# Patient Record
Sex: FEMALE | Race: WHITE | NOT HISPANIC OR LATINO | Employment: UNEMPLOYED | URBAN - METROPOLITAN AREA
[De-identification: names, ages, dates, MRNs, and addresses within clinical notes are randomized per-mention and may not be internally consistent; named-entity substitution may affect disease eponyms.]

---

## 2018-08-27 ENCOUNTER — OFFICE VISIT (OUTPATIENT)
Dept: URGENT CARE | Facility: CLINIC | Age: 58
End: 2018-08-27
Payer: OTHER GOVERNMENT

## 2018-08-27 VITALS
OXYGEN SATURATION: 94 % | HEART RATE: 95 BPM | DIASTOLIC BLOOD PRESSURE: 60 MMHG | RESPIRATION RATE: 18 BRPM | SYSTOLIC BLOOD PRESSURE: 128 MMHG | BODY MASS INDEX: 28.7 KG/M2 | WEIGHT: 152 LBS | HEIGHT: 61 IN | TEMPERATURE: 98.9 F

## 2018-08-27 DIAGNOSIS — J98.01 BRONCHOSPASM: ICD-10-CM

## 2018-08-27 DIAGNOSIS — J22 LRTI (LOWER RESPIRATORY TRACT INFECTION): ICD-10-CM

## 2018-08-27 DIAGNOSIS — R05.9 COUGH: ICD-10-CM

## 2018-08-27 DIAGNOSIS — J04.0 LARYNGITIS: ICD-10-CM

## 2018-08-27 PROCEDURE — 99204 OFFICE O/P NEW MOD 45 MIN: CPT | Performed by: PHYSICIAN ASSISTANT

## 2018-08-27 RX ORDER — FLUTICASONE PROPIONATE 50 MCG
1 SPRAY, SUSPENSION (ML) NASAL DAILY
Qty: 16 G | Refills: 0 | Status: SHIPPED | OUTPATIENT
Start: 2018-08-27

## 2018-08-27 RX ORDER — METHYLPREDNISOLONE 4 MG/1
TABLET ORAL
Qty: 1 KIT | Refills: 0 | Status: SHIPPED | OUTPATIENT
Start: 2018-08-27 | End: 2023-10-05

## 2018-08-27 RX ORDER — AZITHROMYCIN 250 MG/1
TABLET, FILM COATED ORAL
Qty: 1 QUANTITY SUFFICIENT | Refills: 0 | Status: SHIPPED | OUTPATIENT
Start: 2018-08-27 | End: 2023-10-05

## 2018-08-27 ASSESSMENT — ENCOUNTER SYMPTOMS
SORE THROAT: 0
COUGH: 1
VOMITING: 0
CHILLS: 0
ABDOMINAL PAIN: 0
NAUSEA: 0
HEADACHES: 1
WHEEZING: 1
PALPITATIONS: 0
DIARRHEA: 0
SHORTNESS OF BREATH: 0
SINUS PAIN: 0
EYE REDNESS: 0
EYE DISCHARGE: 0
FEVER: 0

## 2018-08-27 NOTE — PROGRESS NOTES
Subjective:     Manuela Restrepo is a 58 y.o. female who presents for Cough (sore throat, loss of voice x1 year)       Notes last two d of low grade fever, notes persistent dry cough, denies wheeze, low grade fever Thursday and Sunday, denies pain w/ loss of voice, denies nausea/vomiting/abdpain/diarrhea/rash, tried aspirin. Denies PMH of asthma, PMH of bronchitis 4x in the last 1-2yrs, remote PMH of pneumonia, does have seasonal allerg w/ no tx.       Cough   Associated symptoms include headaches and wheezing. Pertinent negatives include no chest pain, chills, ear pain, eye redness, fever, rash, sore throat or shortness of breath. Her past medical history is significant for environmental allergies.   No past medical history on file.No past surgical history on file.  Social History     Social History   • Marital status:      Spouse name: N/A   • Number of children: N/A   • Years of education: N/A     Occupational History   • Not on file.     Social History Main Topics   • Smoking status: Never Smoker   • Smokeless tobacco: Never Used   • Alcohol use Yes   • Drug use: No   • Sexual activity: Not on file     Other Topics Concern   • Not on file     Social History Narrative   • No narrative on file    No family history on file. Review of Systems   Constitutional: Positive for malaise/fatigue. Negative for chills and fever.   HENT: Positive for congestion. Negative for ear pain, sinus pain and sore throat.    Eyes: Negative for discharge and redness.   Respiratory: Positive for cough and wheezing. Negative for shortness of breath.    Cardiovascular: Negative for chest pain, palpitations and leg swelling.   Gastrointestinal: Negative for abdominal pain, diarrhea, nausea and vomiting.   Skin: Negative for rash.   Neurological: Positive for headaches.   Endo/Heme/Allergies: Positive for environmental allergies.     Allergies   Allergen Reactions   • Codeine Itching     Patient states that she gets nauseated and  "starts to sweat      Objective:   /60   Pulse 95   Temp 37.2 °C (98.9 °F)   Resp 18   Ht 1.549 m (5' 1\")   Wt 68.9 kg (152 lb)   SpO2 94%   BMI 28.72 kg/m²   Physical Exam   Constitutional: She is oriented to person, place, and time. She appears well-developed and well-nourished. No distress.   HENT:   Head: Normocephalic and atraumatic.   Right Ear: Tympanic membrane, external ear and ear canal normal.   Left Ear: Tympanic membrane, external ear and ear canal normal.   Nose: Nose normal.   Mouth/Throat: Uvula is midline and mucous membranes are normal. Posterior oropharyngeal erythema ( mild PND) present. No oropharyngeal exudate, posterior oropharyngeal edema or tonsillar abscesses.   Eyes: Conjunctivae and lids are normal. Right eye exhibits no discharge. Left eye exhibits no discharge. No scleral icterus.   Neck: Neck supple.   Pulmonary/Chest: Effort normal. No accessory muscle usage. No respiratory distress. She has no decreased breath sounds. She has wheezes. She has rhonchi (mild). She has no rales.   Musculoskeletal: Normal range of motion.   Lymphadenopathy:     She has cervical adenopathy ( mild bilat).   Neurological: She is alert and oriented to person, place, and time. She is not disoriented.   Skin: Skin is warm and dry. She is not diaphoretic. No erythema. No pallor.   Psychiatric: Her speech is normal and behavior is normal.   Nursing note and vitals reviewed.        Assessment/Plan:   Assessment    1. LRTI (lower respiratory tract infection)  - azithromycin (ZITHROMAX) 250 MG Tab; Take as directed on package. Dispense one package.  Dispense: 1 Quantity Sufficient; Refill: 0    2. Cough  - Hydrocod Polst-CPM Polst ER (TUSSIONEX) 10-8 MG/5ML Suspension Extended Release; Take 5 mL by mouth every 12 hours for 7 days.  Dispense: 70 mL; Refill: 0    3. Laryngitis  - fluticasone (FLONASE) 50 MCG/ACT nasal spray; Spray 1 Spray in nose every day.  Dispense: 16 g; Refill: 0    4. Bronchospasm  - " MethylPREDNISolone (MEDROL DOSEPAK) 4 MG Tablet Therapy Pack; Take as directed on package. Dispense one package.  Dispense: 1 Kit; Refill: 0    Supportive care is reviewed with patient/caregiver - recommend to push PO fluids and electrolytes, Nsaids/tylenol, netti pot/saline irrig, humidifier in home, flonase, ponaris, anithistamine,  take full course of Rx, take with probiotics, observe for resolution  Return to clinic with lack of resolution or progression of symptoms.  Cautioned regarding potential side effects of steroid, avoid nsaids while using  Cautioned regarding potential for sedation with medication.    Differential diagnosis, natural history, supportive care, and indications for immediate follow-up discussed.

## 2023-03-24 ENCOUNTER — TELEPHONE (OUTPATIENT)
Dept: HEALTH INFORMATION MANAGEMENT | Facility: OTHER | Age: 63
End: 2023-03-24
Payer: COMMERCIAL

## 2023-06-14 ENCOUNTER — APPOINTMENT (OUTPATIENT)
Dept: SLEEP MEDICINE | Facility: MEDICAL CENTER | Age: 63
End: 2023-06-14
Attending: INTERNAL MEDICINE
Payer: COMMERCIAL

## 2023-10-05 ENCOUNTER — HOSPITAL ENCOUNTER (OUTPATIENT)
Dept: RADIOLOGY | Facility: MEDICAL CENTER | Age: 63
End: 2023-10-05
Attending: INTERNAL MEDICINE

## 2023-10-05 ENCOUNTER — OFFICE VISIT (OUTPATIENT)
Dept: SLEEP MEDICINE | Facility: MEDICAL CENTER | Age: 63
End: 2023-10-05
Attending: INTERNAL MEDICINE
Payer: COMMERCIAL

## 2023-10-05 VITALS
HEART RATE: 90 BPM | DIASTOLIC BLOOD PRESSURE: 72 MMHG | SYSTOLIC BLOOD PRESSURE: 124 MMHG | WEIGHT: 143 LBS | OXYGEN SATURATION: 93 % | BODY MASS INDEX: 27 KG/M2 | HEIGHT: 61 IN

## 2023-10-05 DIAGNOSIS — R05.3 CHRONIC COUGH: ICD-10-CM

## 2023-10-05 DIAGNOSIS — R94.2 ABNORMAL PULMONARY FUNCTION: ICD-10-CM

## 2023-10-05 DIAGNOSIS — Z87.891 FORMER SMOKER: ICD-10-CM

## 2023-10-05 PROCEDURE — 99212 OFFICE O/P EST SF 10 MIN: CPT | Performed by: INTERNAL MEDICINE

## 2023-10-05 PROCEDURE — 3074F SYST BP LT 130 MM HG: CPT | Performed by: INTERNAL MEDICINE

## 2023-10-05 PROCEDURE — 3078F DIAST BP <80 MM HG: CPT | Performed by: INTERNAL MEDICINE

## 2023-10-05 PROCEDURE — 99204 OFFICE O/P NEW MOD 45 MIN: CPT | Performed by: INTERNAL MEDICINE

## 2023-10-05 RX ORDER — IPRATROPIUM BROMIDE AND ALBUTEROL SULFATE 2.5; .5 MG/3ML; MG/3ML
3 SOLUTION RESPIRATORY (INHALATION) 2 TIMES DAILY
Qty: 180 ML | Refills: 6 | Status: SHIPPED | OUTPATIENT
Start: 2023-10-05

## 2023-10-05 ASSESSMENT — ENCOUNTER SYMPTOMS
PND: 0
SPEECH CHANGE: 0
NECK PAIN: 0
PALPITATIONS: 0
FALLS: 0
DIARRHEA: 0
BACK PAIN: 0
DIAPHORESIS: 0
CONSTIPATION: 0
EYE PAIN: 0
WEAKNESS: 0
SPUTUM PRODUCTION: 1
HEADACHES: 0
WHEEZING: 1
BLURRED VISION: 0
ABDOMINAL PAIN: 0
VOMITING: 0
SHORTNESS OF BREATH: 0
PHOTOPHOBIA: 0
ORTHOPNEA: 0
EYE DISCHARGE: 0
DIZZINESS: 0
FOCAL WEAKNESS: 0
CHILLS: 0
NAUSEA: 0
SORE THROAT: 0
SINUS PAIN: 0
COUGH: 1
DOUBLE VISION: 0
FEVER: 0
MYALGIAS: 0
EYE REDNESS: 0
HEMOPTYSIS: 0
HEARTBURN: 0
WEIGHT LOSS: 0
CLAUDICATION: 0
DEPRESSION: 0
TREMORS: 0
STRIDOR: 0

## 2023-10-05 NOTE — PATIENT INSTRUCTIONS
Nebulizer treatment with duonebs twice daily in AM and PM  Okay to continue albuterol inhaler as needed   Non-fasting labs   CT chest NO contrast  Updated lung function testing